# Patient Record
Sex: FEMALE | Race: ASIAN | NOT HISPANIC OR LATINO | Employment: UNEMPLOYED | URBAN - METROPOLITAN AREA
[De-identification: names, ages, dates, MRNs, and addresses within clinical notes are randomized per-mention and may not be internally consistent; named-entity substitution may affect disease eponyms.]

---

## 2017-01-27 ENCOUNTER — OFFICE VISIT - HEALTHEAST (OUTPATIENT)
Dept: FAMILY MEDICINE | Facility: CLINIC | Age: 22
End: 2017-01-27

## 2017-01-27 DIAGNOSIS — N92.6 IRREGULAR PERIODS: ICD-10-CM

## 2017-01-27 DIAGNOSIS — N89.8 VAGINAL DISCHARGE: ICD-10-CM

## 2017-09-20 ENCOUNTER — COMMUNICATION - HEALTHEAST (OUTPATIENT)
Dept: SCHEDULING | Facility: CLINIC | Age: 22
End: 2017-09-20

## 2018-05-01 ENCOUNTER — RECORDS - HEALTHEAST (OUTPATIENT)
Dept: ADMINISTRATIVE | Facility: OTHER | Age: 23
End: 2018-05-01

## 2018-05-11 ENCOUNTER — RECORDS - HEALTHEAST (OUTPATIENT)
Dept: ADMINISTRATIVE | Facility: OTHER | Age: 23
End: 2018-05-11

## 2018-05-30 ENCOUNTER — RECORDS - HEALTHEAST (OUTPATIENT)
Dept: ADMINISTRATIVE | Facility: OTHER | Age: 23
End: 2018-05-30

## 2018-06-04 LAB
A1C_EXT- HISTORICAL: NORMAL
CHLAMYDIA_EXT- HISTORICAL: NORMAL
INR_EXT- HISTORICAL: NORMAL
LDLC SERPL CALC-MCNC: NORMAL MG/DL
LEAD_EXT- HISTORICAL: NORMAL
MICROALB_EXT- HISTORICAL: NORMAL
PAP SMEAR - HIM PATIENT REPORTED: NORMAL

## 2018-06-20 ENCOUNTER — RECORDS - HEALTHEAST (OUTPATIENT)
Dept: ADMINISTRATIVE | Facility: OTHER | Age: 23
End: 2018-06-20

## 2018-06-26 ENCOUNTER — COMMUNICATION - HEALTHEAST (OUTPATIENT)
Dept: ADMINISTRATIVE | Facility: CLINIC | Age: 23
End: 2018-06-26

## 2018-07-20 ENCOUNTER — AMBULATORY - HEALTHEAST (OUTPATIENT)
Dept: FAMILY MEDICINE | Facility: CLINIC | Age: 23
End: 2018-07-20

## 2018-07-26 ENCOUNTER — RECORDS - HEALTHEAST (OUTPATIENT)
Dept: ADMINISTRATIVE | Facility: OTHER | Age: 23
End: 2018-07-26

## 2018-08-02 ENCOUNTER — TRANSFERRED RECORDS (OUTPATIENT)
Dept: HEALTH INFORMATION MANAGEMENT | Facility: CLINIC | Age: 23
End: 2018-08-02

## 2018-08-02 ENCOUNTER — PRENATAL OFFICE VISIT - HEALTHEAST (OUTPATIENT)
Dept: MIDWIFE SERVICES | Facility: CLINIC | Age: 23
End: 2018-08-02

## 2018-08-02 DIAGNOSIS — Z34.82 ENCOUNTER FOR SUPERVISION OF OTHER NORMAL PREGNANCY IN SECOND TRIMESTER: ICD-10-CM

## 2018-08-02 RX ORDER — ACETAMINOPHEN 325 MG/1
650 TABLET ORAL EVERY 6 HOURS PRN
Status: SHIPPED | COMMUNITY
Start: 2018-08-02

## 2018-08-02 ASSESSMENT — MIFFLIN-ST. JEOR: SCORE: 1303.23

## 2018-08-05 ENCOUNTER — COMMUNICATION - HEALTHEAST (OUTPATIENT)
Dept: TELEHEALTH | Facility: CLINIC | Age: 23
End: 2018-08-05

## 2018-08-08 ENCOUNTER — COMMUNICATION - HEALTHEAST (OUTPATIENT)
Dept: MIDWIFE SERVICES | Facility: CLINIC | Age: 23
End: 2018-08-08

## 2018-08-08 ENCOUNTER — RECORDS - HEALTHEAST (OUTPATIENT)
Dept: ADMINISTRATIVE | Facility: OTHER | Age: 23
End: 2018-08-08

## 2018-08-08 DIAGNOSIS — Z13.79 GENETIC SCREENING: ICD-10-CM

## 2018-08-10 ENCOUNTER — OFFICE VISIT - HEALTHEAST (OUTPATIENT)
Dept: FAMILY MEDICINE | Facility: CLINIC | Age: 23
End: 2018-08-10

## 2018-08-10 DIAGNOSIS — H65.90 MEE (MIDDLE EAR EFFUSION): ICD-10-CM

## 2018-08-14 ENCOUNTER — COMMUNICATION - HEALTHEAST (OUTPATIENT)
Dept: FAMILY MEDICINE | Facility: CLINIC | Age: 23
End: 2018-08-14

## 2018-08-15 ENCOUNTER — AMBULATORY - HEALTHEAST (OUTPATIENT)
Dept: OBGYN | Facility: CLINIC | Age: 23
End: 2018-08-15

## 2018-08-15 DIAGNOSIS — O28.3 ABNORMAL ULTRASONIC FINDING ON ANTENATAL SCREENING OF MOTHER: ICD-10-CM

## 2018-08-23 ENCOUNTER — PRENATAL OFFICE VISIT - HEALTHEAST (OUTPATIENT)
Dept: MIDWIFE SERVICES | Facility: CLINIC | Age: 23
End: 2018-08-23

## 2018-08-23 DIAGNOSIS — O26.12 LOW WEIGHT GAIN DURING PREGNANCY IN SECOND TRIMESTER: ICD-10-CM

## 2018-08-23 DIAGNOSIS — Z34.82 ENCOUNTER FOR SUPERVISION OF OTHER NORMAL PREGNANCY IN SECOND TRIMESTER: ICD-10-CM

## 2018-08-23 ASSESSMENT — MIFFLIN-ST. JEOR: SCORE: 1316.84

## 2018-09-04 ENCOUNTER — COMMUNICATION - HEALTHEAST (OUTPATIENT)
Dept: ADMINISTRATIVE | Facility: CLINIC | Age: 23
End: 2018-09-04

## 2018-09-04 ENCOUNTER — PRE VISIT (OUTPATIENT)
Dept: MATERNAL FETAL MEDICINE | Facility: CLINIC | Age: 23
End: 2018-09-04

## 2018-09-17 ENCOUNTER — HOSPITAL ENCOUNTER (OUTPATIENT)
Dept: ULTRASOUND IMAGING | Facility: CLINIC | Age: 23
Discharge: HOME | End: 2018-09-17
Attending: ADVANCED PRACTICE MIDWIFE | Admitting: ADVANCED PRACTICE MIDWIFE
Payer: MEDICAID

## 2018-09-17 ENCOUNTER — RECORDS - HEALTHEAST (OUTPATIENT)
Dept: ADMINISTRATIVE | Facility: OTHER | Age: 23
End: 2018-09-17

## 2018-09-17 ENCOUNTER — OFFICE VISIT (OUTPATIENT)
Dept: MATERNAL FETAL MEDICINE | Facility: CLINIC | Age: 23
End: 2018-09-17
Attending: ADVANCED PRACTICE MIDWIFE
Payer: MEDICAID

## 2018-09-17 DIAGNOSIS — Z36.3 ENCOUNTER FOR ROUTINE SCREENING FOR MALFORMATION USING ULTRASONICS: Primary | ICD-10-CM

## 2018-09-17 DIAGNOSIS — O26.90 PREGNANCY RELATED CONDITION, ANTEPARTUM: ICD-10-CM

## 2018-09-17 PROCEDURE — 76811 OB US DETAILED SNGL FETUS: CPT

## 2018-09-17 NOTE — MR AVS SNAPSHOT
"              After Visit Summary   2018    Christina Davis    MRN: [id number]           Patient Information     Date Of Birth          1995        Visit Information        Provider Department      2018 2:45 PM Nasra Mazariegos, DO [facility]        Today's Diagnoses     Encounter for routine screening for malformation using ultrasonics    -  1       Follow-ups after your visit        Who to contact     If you have questions or need follow up information about today's clinic visit or your schedule please contact [facility] directly at [phone].  Normal or non-critical lab and imaging results will be communicated to you by [email or web address], letter or phone within 4 business days after the clinic has received the results. If you do not hear from us within 7 days, please contact the clinic through License Buddyt or phone. If you have a critical or abnormal lab result, we will notify you by phone as soon as possible.  Submit refill requests through Urbasolar or call your pharmacy and they will forward the refill request to us. Please allow 3 business days for your refill to be completed.          Additional Information About Your Visit        MyChart Information     Urbasolar lets you send messages to your doctor, view your test results, renew your prescriptions, schedule appointments and more. To sign up, go to [email or web address] . Click on \"Log in\" on the left side of the screen, which will take you to the Welcome page. Then click on \"Sign up Now\" on the right side of the page.     You will be asked to enter the access code listed below, as well as some personal information. Please follow the directions to create your username and password.     Your access code is: [email or web address]  Expires: 2018  3:26 PM     Your access code will  in 90 days. If you need help or a new code, please call your [place] clinic or [phone].        Care " EveryWhere ID     This is your Care EveryWhere ID. This could be used by other organizations to access your [place] medical records  [id number]         Blood Pressure from Last 3 Encounters:   No data found for BP    Weight from Last 3 Encounters:   No data found for Wt              Today, you had the following     No orders found for display       Primary Care Provider Office Phone # Fax #    Yue Barry [phone]       [address] [address]        Equal Access to Services     TC KELLY : Hadii aad ku hadasho Soomaali, waaxda luqadaha, qaybta kaalmada adeegyada, waxay idiin hayaan adeeg carolynaraalex lanikita . So [facility] [phone].    ATENCIÓN: Si habla español, tiene a knox disposición servicios gratuitos de asistencia lingüística. [facility] [phone].    We comply with applicable federal civil rights laws and [place] laws. We do not discriminate on the basis of race, color, national origin, age, disability, sex, sexual orientation, or gender identity.            Thank you!     Thank you for choosing [facility]  for your care. Our goal is always to provide you with excellent care. Hearing back from our patients is one way we can continue to improve our services. Please take a few minutes to complete the written survey that you may receive in the mail after your visit with us. Thank you!             Your Updated Medication List - Protect others around you: Learn how to safely use, store and throw away your medicines at www.disposemymeds.org.      Notice  As of 9/17/2018  3:26 PM    You have not been prescribed any medications.

## 2018-09-17 NOTE — PROGRESS NOTES
"Please see \"Imaging\" tab under \"Chart Review\" for details of today's US.    Nasra Mazariegos, DO    "

## 2018-09-18 ENCOUNTER — AMBULATORY - HEALTHEAST (OUTPATIENT)
Dept: MIDWIFE SERVICES | Facility: CLINIC | Age: 23
End: 2018-09-18

## 2018-10-03 ENCOUNTER — COMMUNICATION - HEALTHEAST (OUTPATIENT)
Dept: MIDWIFE SERVICES | Facility: CLINIC | Age: 23
End: 2018-10-03

## 2018-10-03 ENCOUNTER — COMMUNICATION - HEALTHEAST (OUTPATIENT)
Dept: OBGYN | Facility: CLINIC | Age: 23
End: 2018-10-03

## 2018-10-03 ENCOUNTER — COMMUNICATION - HEALTHEAST (OUTPATIENT)
Dept: FAMILY MEDICINE | Facility: CLINIC | Age: 23
End: 2018-10-03

## 2018-10-03 ENCOUNTER — PRENATAL OFFICE VISIT - HEALTHEAST (OUTPATIENT)
Dept: MIDWIFE SERVICES | Facility: CLINIC | Age: 23
End: 2018-10-03

## 2018-10-03 DIAGNOSIS — O36.8190 DECREASED FETAL MOVEMENT: ICD-10-CM

## 2018-10-03 DIAGNOSIS — O36.8190 DECREASED FETAL MOVEMENT AFFECTING MANAGEMENT OF PREGNANCY, ANTEPARTUM, SINGLE OR UNSPECIFIED FETUS: ICD-10-CM

## 2018-10-03 DIAGNOSIS — Z34.82 ENCOUNTER FOR SUPERVISION OF OTHER NORMAL PREGNANCY, SECOND TRIMESTER: ICD-10-CM

## 2018-10-03 LAB
FASTING STATUS PATIENT QL REPORTED: NORMAL
GLUCOSE 1H P 50 G GLC PO SERPL-MCNC: 78 MG/DL (ref 70–139)
HGB BLD-MCNC: 11.2 G/DL (ref 12–16)

## 2018-10-03 ASSESSMENT — MIFFLIN-ST. JEOR: SCORE: 1334.98

## 2018-10-04 LAB — T PALLIDUM AB SER QL: NEGATIVE

## 2018-10-05 ENCOUNTER — AMBULATORY - HEALTHEAST (OUTPATIENT)
Dept: OBGYN | Facility: CLINIC | Age: 23
End: 2018-10-05

## 2018-10-10 ENCOUNTER — HOSPITAL ENCOUNTER (OUTPATIENT)
Dept: OBGYN | Facility: HOSPITAL | Age: 23
Discharge: HOME | End: 2018-10-10
Attending: MIDWIFE | Admitting: MIDWIFE

## 2018-10-10 ENCOUNTER — HOSPITAL ENCOUNTER (OUTPATIENT)
Dept: ULTRASOUND IMAGING | Facility: HOSPITAL | Age: 23
Discharge: HOME | End: 2018-10-10
Attending: ADVANCED PRACTICE MIDWIFE

## 2018-10-10 DIAGNOSIS — Z34.82 ENCOUNTER FOR SUPERVISION OF OTHER NORMAL PREGNANCY, SECOND TRIMESTER: ICD-10-CM

## 2018-10-10 DIAGNOSIS — O36.8190 DECREASED FETAL MOVEMENT: ICD-10-CM

## 2018-10-10 ASSESSMENT — MIFFLIN-ST. JEOR: SCORE: 1360.5

## 2018-10-12 ENCOUNTER — AMBULATORY - HEALTHEAST (OUTPATIENT)
Dept: MIDWIFE SERVICES | Facility: CLINIC | Age: 23
End: 2018-10-12

## 2018-11-01 ENCOUNTER — PRENATAL OFFICE VISIT - HEALTHEAST (OUTPATIENT)
Dept: MIDWIFE SERVICES | Facility: CLINIC | Age: 23
End: 2018-11-01

## 2018-11-01 DIAGNOSIS — Z91.09 ENVIRONMENTAL ALLERGIES: ICD-10-CM

## 2018-11-01 DIAGNOSIS — Z34.82 ENCOUNTER FOR SUPERVISION OF OTHER NORMAL PREGNANCY, SECOND TRIMESTER: ICD-10-CM

## 2018-11-01 ASSESSMENT — MIFFLIN-ST. JEOR: SCORE: 1360.5

## 2018-11-14 ENCOUNTER — PRENATAL OFFICE VISIT - HEALTHEAST (OUTPATIENT)
Dept: MIDWIFE SERVICES | Facility: CLINIC | Age: 23
End: 2018-11-14

## 2018-11-14 DIAGNOSIS — Z34.82 ENCOUNTER FOR SUPERVISION OF OTHER NORMAL PREGNANCY, SECOND TRIMESTER: ICD-10-CM

## 2018-11-14 ASSESSMENT — MIFFLIN-ST. JEOR: SCORE: 1351.43

## 2018-11-26 ENCOUNTER — COMMUNICATION - HEALTHEAST (OUTPATIENT)
Dept: ADMINISTRATIVE | Facility: CLINIC | Age: 23
End: 2018-11-26

## 2021-05-30 VITALS — WEIGHT: 136 LBS | BODY MASS INDEX: 24.87 KG/M2

## 2021-06-01 VITALS — BODY MASS INDEX: 25.68 KG/M2 | WEIGHT: 136 LBS | HEIGHT: 61 IN

## 2021-06-01 VITALS — WEIGHT: 139 LBS | HEIGHT: 61 IN | BODY MASS INDEX: 26.24 KG/M2

## 2021-06-01 VITALS — BODY MASS INDEX: 25.71 KG/M2 | WEIGHT: 137.2 LBS

## 2021-06-02 VITALS — BODY MASS INDEX: 26.87 KG/M2 | WEIGHT: 146 LBS | HEIGHT: 62 IN

## 2021-06-02 VITALS — HEIGHT: 62 IN | BODY MASS INDEX: 26.87 KG/M2 | WEIGHT: 146 LBS

## 2021-06-02 VITALS — HEIGHT: 61 IN | WEIGHT: 143 LBS | BODY MASS INDEX: 27 KG/M2

## 2021-06-02 VITALS — BODY MASS INDEX: 26.5 KG/M2 | WEIGHT: 144 LBS | HEIGHT: 62 IN

## 2021-06-08 NOTE — PROGRESS NOTES
Assessment/Plan    ICD-10-CM    1. Vaginal discharge N89.8 Wet Prep, Vaginal     Pregnancy (Beta-hCG, Qual), Urine     Chlamydia trachomatis & Neisseria gonorrhoeae, Amplified Detection   2. Irregular periods N92.6 Pregnancy (Beta-hCG, Qual), Urine     Unclear etiology.  Will recheck if not improving.     Patient Instructions   Switch to hypoallergenic soap.    Recheck if symptoms are not improving.         The risks, benefits, and treatment options of prescribed medications or other treatments have been discussed with the patient. The patient should call or schedule a follow up appointment if not improvement or any new symptoms.       Subjective:    Christina Davis is a 21 y.o. female who presents with   Chief Complaint   Patient presents with     Vaginal Discharge     odor, started 5 days ago.    .     Noticed odor first after intercourse.  Has continued to notice this odor.  After that started to notice extra discharge vaginally and discharge.  She has had bacterial vaginosis in 2012 and did not have symptoms. LMP was one month ago.  States her periods are irregular.  Not on any birth control currently.  No abdominal pain or fever. She is  with no other partners.       Past medical history, social history and medications reviewed in the medical record.     ROS:  5 point review of systems negative except as noted above in the HPI, including Gen, Resp., CV, GI &  system review.     Exam:  Visit Vitals     /60     Pulse 87     Temp 98.7  F (37.1  C) (Oral)     Resp 15     Wt 136 lb (61.7 kg)     LMP 12/12/2016 (Within Weeks)     SpO2 99%     BMI 24.87 kg/m2        Gen:  No acute distress  Abdomen: soft, no hepatosplenomegaly.  No tenderness    Musculoskeletal:  No costovertebral angle tenderness   Genitalia: Free of lesion, vaginal rugae intact, pink and moist. Vaginal discharge is white. Cervix closed, pink, free of lesion.   Bimanual: Uterus is small, firm, mobile and nontender. No cervical motion  tenderness. Adnexa are nontender with palpation.         Results for orders placed or performed in visit on 01/27/17   Wet Prep, Vaginal   Result Value Ref Range    Yeast Result No yeast seen No yeast seen    Trichomonas No Trichomonas seen No Trichomonas seen    Clue Cells, Wet Prep No Clue cells seen No Clue cells seen   Pregnancy (Beta-hCG, Qual), Urine   Result Value Ref Range    Pregnancy Test, Urine Negative Negative    Specific Gravity, UA 1.020 1.001 - 1.030       No results found.

## 2021-06-16 PROBLEM — Z34.82 ENCOUNTER FOR SUPERVISION OF OTHER NORMAL PREGNANCY, SECOND TRIMESTER: Status: ACTIVE | Noted: 2018-08-02

## 2021-06-16 PROBLEM — Z13.79 GENETIC SCREENING: Status: ACTIVE | Noted: 2018-08-08

## 2021-06-16 PROBLEM — O28.3 ABNORMAL ULTRASONIC FINDING ON ANTENATAL SCREENING OF MOTHER: Status: ACTIVE | Noted: 2018-08-15

## 2021-06-19 NOTE — PROGRESS NOTES
Results review: FAS results reviewed with Christina. We specifically reviewed the EIF found in left ventricle. We discussed reassuring information with normal progenity screening in mid pregnancy and no other abnormalities seen in the survey. Christina is anxious about this result and desires further follow up. I offered level II ultrasound as a follow up to this finding and she accepts today. Desires [facility] as location and so ordered. Advised they will call to schedule this follow up. Additionally she has not yet scheduled for her next routine prenatal visit, transferred to schedulers to complete this.

## 2021-06-19 NOTE — PROGRESS NOTES
Assessment/Plan:        1. OSCAR (middle ear effusion)  Exam findings were discussed and pathophysiology reviewed  Advisement:  Use Flonase 2 sprays each nostrils twice a day   avoid decongestants since pregnant  Close follow up with any worsening or no significant improvement as anticipated.             Subjective:    Patient ID:   Christina Davis is a 22 y.o. female who is gravid at 19wks , presenting with bilateral ear pressure for 2.5 weeks.  She has felt occasional pain in the years but not too concerning.  She has noticed the symptoms mostly when lying down as if having fluid moving around in her ears.      Review of Systems  General : negative  Ophthalmic : negative  ENT : See HPI  Respiratory : no cough, shortness of breath, or wheezing  Cardiovascular : no chest pain or dyspnea on exertion  Allergy: reviewed      The following patient's history were reviewed and updated as appropriate:   She  has a past medical history of Anxiety (2015); Frequent headaches; Migraine; Miscarriage (10/16/2015); Miscarriage (09/14/2016); Normal delivery (1/26/2015); and Seasonal allergies..      Outpatient Encounter Prescriptions as of 8/10/2018   Medication Sig Dispense Refill     fluticasone (FLONASE) 50 mcg/actuation nasal spray Apply 1 spray into each nostril daily.       acetaminophen (TYLENOL) 325 MG tablet Take 650 mg by mouth every 6 (six) hours as needed for pain.       pseudoephedrine (SUDAFED) 30 MG tablet Take 30 mg by mouth every 4 (four) hours as needed for congestion.       No facility-administered encounter medications on file as of 8/10/2018.          Objective:   /58 (Patient Site: Right Arm, Patient Position: Sitting, Cuff Size: Adult Regular)  Pulse 88  Temp 98.2  F (36.8  C) (Oral)   Wt 137 lb 3.2 oz (62.2 kg)  LMP 02/27/2018  SpO2 98%  BMI 25.71 kg/m2      Physical Exam    General Appearance:    Alert, cooperative, no distress   Head:    Normocephalic, Sinus: Nontender   Eyes:    PERRL,  conjunctiva/corneas clear, EOM's intact, both eyes   Ears:   Medial ear effusion bilaterally, normal canals   Throat:   mucous membranes moist, pharynx normal without lesions   Neck:   Supple, symmetrical, trachea midline, no adenopathy;     thyroid:  no enlargement/tenderness/nodules;    Lungs:     clear to auscultation, no wheezes, rales or rhonchi, symmetric air entry    Chest Wall:    No tenderness or deformity    Heart:    Regular rate and rhythm, S1 and S2 normal, no murmur, rub   or gallop   Skin:   Skin color, texture, turgor normal, no rashes or lesions

## 2021-06-19 NOTE — PROGRESS NOTES
Transfer of care Visit    PRENATAL VISIT   FIRST OBSTETRICAL EXAM - OB    Assessment / Impression   23 yo  at 18w 0d per first trimester u/s  Transfer of care patient with adequate prenatal care prior to transfer  EPDS=5  Hx Headaches    Plan:     1. Labs not indicated today.Records received showing all initial OB lab results.  Lead screening not indicated per [facility] questionnaire.  2. Medications: Prenatal vitamins. Encouraged a vitamin D3 geltab, 5723-6249 IU daily and an omega 3 fatty acid supplement daily as well.   3. Problem list reviewed and updated.  4. Genetic screening: discussed and ordered. Labs drawn today.  5. Role of ultrasound in pregnancy discussed; Anatomy scan ordered today.  Patient advised to schedule ultrasound between 19 and 21 weeks gestation.  6. Oriented to [facility] care and philosophy;  group, on-call and contact info discussed.  7. Appropriate anticipatory guidance including nutrition & supplements, weight gain recommendation of 25-35 pounds 4-6 weeks, exercise, resources, lab testing & warning signs discussed.  Questions answered.   8. Please complete domestic violence screening at next visit.  Patient's partner present for IOB.  9. Please ask patient how she plans to feed her baby  10. Follow up: 4-6 weeks    Total time spent with patient 45 minutes, >50% counseling, education and coordination of care.    MANPREET Bunch, YAS  2018  3:06 PM    Subjective:    Christina is a 22 y.o.  at here today for her First Obstetrical Exam.  She is here with her partner Amita Posadas and their 2 children.  She is 18 weeks 0 days by first trimester ultrasound performed on 2018 with gestational age determined to be 6 weeks 1/7 days.  Her MONICA is 1/3/2019.  Patient denies complications with her pregnancy so far.  She denies pregnancy related side effects including nausea and fatigue.  She tells me she feels well and has not yet felt fetal movement.      Transfer of care from [place],  "specifically [place], [place]  Number of visits 2  Initial visit date 2018 at 8 weeks 6/7 days,   Pre-preg wgt 132 lbs  Initial hgb 13.4  Initial platelets  351  UC negative:   Reason for transfer: Moved to be near family    Patient has lived in [place] before and she and her family are moving back to be near [facility].  Patient states she desires a water birth and this is part of the reason she is seeking care from the midwives.  She states she has not been taking a prenatal vitamin.  Encouraged her to take a prenatal vitamin daily as well as vitamin D3 3000 4000 international units daily and a fish oil capsule daily.  Patient is a former smoker from age 16 until she became pregnant.  She states she has not smoked since becoming pregnant.  Upon review of her weight gain she has gained 4 pounds in this pregnancy so far.  She has gained less than is recommended for this stage of pregnancy.  Patient states she has access to food and eats when she is hungry.     Discussed genetic screening.  Reviewed available and recommended screenings including the Progenity Innatal and Preparent screens.  Patient accepts both of these.  Labs to be drawn today.  Patient states she does want to know the sex of the baby.    Patient is unemployed and a stay-at-home parent for now.  She states she may look for employment soon.  When asked why she is not moving back to [place] from [place] she states it is \"[place] country\" and she desires to live closer to her family here in [place].  She states she is very happy to be back.          Health Hx: Reviewed personal and family health history with patient.  See history section of chart.    Hx of autism on FOB side of family    Patient's last Pap smear was May 30, 2018.  Result was negative.        OB History    Para Term  AB Living   4 2 2 0 1 2   SAB TAB Ectopic Multiple Live Births   1 0 0 0 2      # Outcome Date GA Lbr Daniel/2nd Weight Sex Delivery " "Anes PTL Lv   4 SAB 10/13/15           3 Term 01/26/15 38w0d 01:11 / 00:01 7 lb (3.175 kg) F Vag-Spont None N AURELIA   2 Term 14 37w2d  6 lb 12 oz (3.062 kg) M Vag-Spont EPI  AURELIA      Birth Comments: nuchal cord.  pushed x 1 hr.  did not BF   1                  Past Medical History:   Diagnosis Date     Anxiety      Frequent headaches      Miscarriage 10/16/2015     Miscarriage 2016     Normal delivery 2015     Seasonal allergies      No past surgical history on file.  Social History   Substance Use Topics     Smoking status: Current Every Day Smoker     Years: 0.10     Types: Cigarettes     Smokeless tobacco: Never Used      Comment: \"1/2 a cigarette daily PRN\"     Alcohol use No     No current outpatient prescriptions on file.     No current facility-administered medications for this visit.      No Known Allergies    Risk factors: Low weight gain and second trimester    Review of Systems  General:  Denies problem  Eyes: Denies problem  Ears/Nose/Throat: Denies problem  Cardiovascular: Denies problem  Respiratory:  Denies problem  Gastrointestinal:  denies problems  Genitourinary: denies problems  Musculoskeletal:  Denies problem  Skin: Denies problem  Neurologic:headaches  Psychiatric: denies problems  Endocrine: denies problems    Objective:   There were no vitals taken for this visit.  Physical Exam: Deferred    Lab:   Results for orders placed or performed in visit on 18   Primary Care External Results   Result Value Ref Range    uALB External Result      A1c External Result      LDL External Results      INR External Result      Lead External Result      PAP External Result [id number]     Chlamydia External Result         "

## 2021-06-20 NOTE — PROGRESS NOTES
Outpatient/Triage Note:    Patient Name:  Christian Davis  :      1995  MRN:      [id number]    Assessment:   @ 27w6d. F/u from 10/3/18. Had decreases FM with BPP 6/8 (2 off for breathing)  BPP 8/8 today and reactive NST     Plan:   -BPP 8/8 with reactive NST today  -Discharge to home undelivered. Reviewed warning signs including decreased fetal movement. Reviewed safe parameters for [facility]. Discussed BPP and ABRIL results with patient.   -Discussed anterior placenta with patient and that it blunt some of the feeling of baby's kicks.   -Reviewed how to contact on-call CNM. Follow-up in clinic with CNM as scheduled or sooner as needed. All questions answered. Agrees with plan.     Subjective  Christina Davis is a 23 y.o.  who presented to [facility] for follow up from 10/3/18. She presents with her  and young son. On 10/3/18 she had decreased FM and then had a  BPP and NST. BPP was 6/8 off for breathing. NST was reactive. Today BPP was 8/8 and NST was reactive. Patient seems reassured and has no further questions. Patient understands the need to follow up in the clinic as usual.       Objective  Temp:  [98  F (36.7  C)] (P) 98  F (36.7  C)  Heart Rate:  [88] (P) 88    Physical Exam:  General appearance: Pleasant, well-groomed,   Psych: AAO x3  Skin: Pink, warm & dry  HEENT: unremarkable  Respiratory:  Unlabored           Fetal Heart Rate:    Rate:Baseline normal:  Cat 1, + accels, no decels, moderate variability        Uterine Activity: None       Cervical Exam: Deferred      Extremeties: No edema         Total time spent with patient:15 minutes, >50% time spent counseling and coordination of care.  Date:  10/10/2018  Time:  5:05 PM    Patient was seen with student, MIMI Arreola who was present for learning. I personally assessed, examined and made clinical decisions reflected in the documentation.   MANPREET Guevara CNM      .

## 2021-06-20 NOTE — PROGRESS NOTES
Christina presents to clinic by herself today.  She reports active fetal movement, started feeling movement at 18 weeks.  She has a level 2 ultrasound scheduled for September as patient desires to follow-up on an EIF.  Of note, her progenity the screen was negative.  Patient does not plan childbirth education and does not have any more plans to travel.  She also tells me that she is not smoking.  I commended her for this change.  Weight gain chart reviewed with patient.  Weight gain continues to be below recommended level though is trending up normally.  Reviewed signs and symptoms of  labor and encouraged her to call the midwife on-call at the case that she has greater than 6 contractions in an hour, experiences watery leaking of vaginal fluid, or experience is vaginal bleeding.  Also encouraged her to call if she experiences decreased or absent fetal movement.  Return to clinic in 4-6 weeks.  1 hour GCT, hgb, and RPR at next visit.

## 2021-06-20 NOTE — PROGRESS NOTES
Christina Davis is here with her two children for a routine prenatal visit at 26w6d.  She has c/o not feeling her baby move as much.  Has in general, had trouble feeling her baby move and attributes that to her anterior placenta.  Called this morning with concerns and did FKC and counted 6 in one hour and then fell asleep.  Discussed NST today (though will only be looking for if appropriate for gestational age) and BPP - patient accepts.  When I placed her on the NST - I heard two movements within 30 seconds.  Patient did not perceive those movements.  NST:  145 bpm, moderate variability, +accels, no decels.  Appropriate for gestational age - reassurance provided.  Patient is scheduling BPP on her own.    She also notes dizziness more frequently.  Not taking a PNV.  Reviewed nutritional and hydration needs in pregnancy with changes in volume.    She is feeling fetal movement (see above). Fetal maturity and expectations for fetal movement in the third trimester, how and when to do fetal kick counts and when to call CNM discussed. Appetite is normal. Interval weight gain is appropriate.  28 week labs (1 hour GTT, Hgb & RPR) obtained today.  Risks and benefits of TdaP during pregnancy at 27-36 weeks discussed and desires at a future visit.   Reviewed resources for CBE.  Anticipatory guidance, warning signs (with emphasis on  labor signs and symptoms), when to call and CNM contact information reviewed.     10/3/18 2128:  See phone note from BHASKAR Delgado CNM.  BPP 6/8 - off for fetal breathing - which can be normal at this gestational age.  Nelsy ordered a BPP for next week.  Patient verbalizes understanding.  Entered into dating section.

## 2021-06-21 NOTE — PROGRESS NOTES
Christina Davis is here with her  and daughter for a routine prenatal visit at 31w0d.  She has no concerns and is feeling well.    She is feeling fetal movement much more regularly now!  Feels that it is likely because baby is bigger. Fetal maturity and expectations for fetal movement in the third trimester, how and when to do fetal kick counts and when to call CNM discussed. Appetite is normal. Interval weight gain is appropriate.  Reviewed 28 week lab results.  TdaP will be given today.  Reviewed R/B/A and [facility] recommendation.  Christina shares that they plan to move back to [place] on 18.  I reviewed travel precautions, as they will be driving by car.  She asked how to go about finding care provider for the rest of her pregnancy.  She states that her sister told her that no one will see her for prenatal visit since she is at the end of her pregnancy.  So Christina ask if she should just show up at the hospital when she is in labor.  I explained that it is NOT at all advised to not have prenatal care.  In fact, she will need visits more frequently at the end of her pregnancy.  I also explained that it is NOT a good idea to not have prenatal care for 2 months and then show up in labor at the hospital.  Explained how to go about finding a care provider when she has moved.   Anticipatory guidance, warning signs (with emphasis on  labor signs and symptoms), when to call and CNM contact information reviewed.

## 2021-06-26 NOTE — PROGRESS NOTES
Progress Notes by Yue Ayers at 11/14/2018  4:20 PM     Author: Yue Ayers Service: -- Author Type: Medical Student    Filed: 11/14/2018  4:47 PM Encounter Date: 11/14/2018 Status: Attested    : Yue Ayers (Medical Student) Cosigner: Flavia Cordero APRN, CNM at 11/14/2018  4:47 PM    Attestation signed by Flavia Cordero APRN, CNM at 11/14/2018  4:47 PM    Patient was seen with student, MIMI Monroe who was present for learning. I personally assessed, examined and made clinical decisions reflected in the documentation. MANPREET Pierson CNM                  Patient is here at 32w6d for routine OB visit. She is here by herself today. Patient denies regular contraction, loss of fluid, change in vaginal discharge, vaginal bleeding, and headache. Baby is active. She will be driving to [place] and transferring care to a maternity service there. Release of information completed and patient given a copy of her prenatal care records. They will be driving and we reviewed warning signs of DVT, getting out to walk periodically, getting adequate fluid intake, and taking enough bathroom stops. I also encouraged her to map out hospitals along the way if something were to happen. She states understanding of these precautions. Patient given well wishes for the rest of her pregnancy and for delivery.    Yue Ayers RN, SNM    Patient was seen with student who was present for learning.  I personally assessed, examined and made clinical decisions reflected in the documentation. MANPREET Pierson CNM

## 2021-07-14 PROBLEM — O26.12 LOW WEIGHT GAIN DURING PREGNANCY IN SECOND TRIMESTER: Status: RESOLVED | Noted: 2018-08-25 | Resolved: 2018-11-14

## 2021-08-14 ENCOUNTER — HEALTH MAINTENANCE LETTER (OUTPATIENT)
Age: 26
End: 2021-08-14

## 2021-10-10 ENCOUNTER — HEALTH MAINTENANCE LETTER (OUTPATIENT)
Age: 26
End: 2021-10-10

## 2022-09-18 ENCOUNTER — HEALTH MAINTENANCE LETTER (OUTPATIENT)
Age: 27
End: 2022-09-18

## 2023-10-08 ENCOUNTER — HEALTH MAINTENANCE LETTER (OUTPATIENT)
Age: 28
End: 2023-10-08